# Patient Record
Sex: FEMALE | Race: WHITE
[De-identification: names, ages, dates, MRNs, and addresses within clinical notes are randomized per-mention and may not be internally consistent; named-entity substitution may affect disease eponyms.]

---

## 2022-05-18 ENCOUNTER — HOSPITAL ENCOUNTER (EMERGENCY)
Dept: HOSPITAL 63 - ER | Age: 52
Discharge: ANOTHER HOSPITAL | End: 2022-05-18
Payer: SELF-PAY

## 2022-05-18 VITALS — BODY MASS INDEX: 22.42 KG/M2 | WEIGHT: 126.55 LBS | HEIGHT: 63 IN

## 2022-05-18 VITALS — DIASTOLIC BLOOD PRESSURE: 44 MMHG | SYSTOLIC BLOOD PRESSURE: 90 MMHG

## 2022-05-18 DIAGNOSIS — Z20.822: ICD-10-CM

## 2022-05-18 DIAGNOSIS — R79.89: ICD-10-CM

## 2022-05-18 DIAGNOSIS — J18.9: Primary | ICD-10-CM

## 2022-05-18 DIAGNOSIS — R09.02: ICD-10-CM

## 2022-05-18 LAB
% BANDS: 1 % (ref 0–9)
% LYMPHS: 9 % (ref 24–48)
% MONOS: 5 % (ref 0–10)
% SEGS: 85 % (ref 35–66)
ALBUMIN SERPL-MCNC: 2.8 G/DL (ref 3.4–5)
ALBUMIN/GLOB SERPL: 0.6 {RATIO} (ref 1–1.7)
ALP SERPL-CCNC: 102 U/L (ref 46–116)
ALT SERPL-CCNC: 22 U/L (ref 14–59)
ANION GAP SERPL CALC-SCNC: 14 MMOL/L (ref 6–14)
ANISOCYTOSIS BLD QL SMEAR: SLIGHT
AST SERPL-CCNC: 22 U/L (ref 15–37)
BASOPHILS # BLD AUTO: 0 X10^3/UL (ref 0–0.2)
BASOPHILS NFR BLD: 0 % (ref 0–3)
BILIRUB SERPL-MCNC: 0.8 MG/DL (ref 0.2–1)
BUN/CREAT SERPL: 17 (ref 6–20)
CA-I SERPL ISE-MCNC: 17 MG/DL (ref 7–20)
CALCIUM SERPL-MCNC: 9.2 MG/DL (ref 8.5–10.1)
CHLORIDE SERPL-SCNC: 95 MMOL/L (ref 98–107)
CO2 SERPL-SCNC: 22 MMOL/L (ref 21–32)
CREAT SERPL-MCNC: 1 MG/DL (ref 0.6–1)
EOSINOPHIL NFR BLD: 0 % (ref 0–3)
EOSINOPHIL NFR BLD: 0 X10^3/UL (ref 0–0.7)
ERYTHROCYTE [DISTWIDTH] IN BLOOD BY AUTOMATED COUNT: 14 % (ref 11.5–14.5)
GFR SERPLBLD BASED ON 1.73 SQ M-ARVRAT: 58 ML/MIN
GLOBULIN SER-MCNC: 4.4 G/DL (ref 2.2–3.8)
GLUCOSE SERPL-MCNC: 192 MG/DL (ref 70–99)
HCT VFR BLD CALC: 41.4 % (ref 36–47)
HGB BLD-MCNC: 13.5 G/DL (ref 12–15.5)
INFLUENZA A PATIENT: NEGATIVE
INFLUENZA B PATIENT: NEGATIVE
LYMPHOCYTES # BLD: 1 X10^3/UL (ref 1–4.8)
LYMPHOCYTES NFR BLD AUTO: 6 % (ref 24–48)
MCH RBC QN AUTO: 28 PG (ref 25–35)
MCHC RBC AUTO-ENTMCNC: 33 G/DL (ref 31–37)
MCV RBC AUTO: 86 FL (ref 79–100)
MONO #: 0.8 X10^3/UL (ref 0–1.1)
MONOCYTES NFR BLD: 5 % (ref 0–9)
NEUT #: 14.4 X10^3UL (ref 1.8–7.7)
NEUTROPHILS NFR BLD AUTO: 89 % (ref 31–73)
PLATELET # BLD AUTO: 395 X10^3/UL (ref 140–400)
PLATELET # BLD EST: ADEQUATE 10*3/UL
POTASSIUM SERPL-SCNC: 4.2 MMOL/L (ref 3.5–5.1)
PROT SERPL-MCNC: 7.2 G/DL (ref 6.4–8.2)
RBC # BLD AUTO: 4.79 X10^6/UL (ref 3.5–5.4)
SODIUM SERPL-SCNC: 131 MMOL/L (ref 136–145)
WBC # BLD AUTO: 16.2 X10^3/UL (ref 4–11)

## 2022-05-18 PROCEDURE — 85025 COMPLETE CBC W/AUTO DIFF WBC: CPT

## 2022-05-18 PROCEDURE — 36415 COLL VENOUS BLD VENIPUNCTURE: CPT

## 2022-05-18 PROCEDURE — 96366 THER/PROPH/DIAG IV INF ADDON: CPT

## 2022-05-18 PROCEDURE — 96365 THER/PROPH/DIAG IV INF INIT: CPT

## 2022-05-18 PROCEDURE — 93005 ELECTROCARDIOGRAM TRACING: CPT

## 2022-05-18 PROCEDURE — 99285 EMERGENCY DEPT VISIT HI MDM: CPT

## 2022-05-18 PROCEDURE — 96368 THER/DIAG CONCURRENT INF: CPT

## 2022-05-18 PROCEDURE — 85007 BL SMEAR W/DIFF WBC COUNT: CPT

## 2022-05-18 PROCEDURE — 84484 ASSAY OF TROPONIN QUANT: CPT

## 2022-05-18 PROCEDURE — 87428 SARSCOV & INF VIR A&B AG IA: CPT

## 2022-05-18 PROCEDURE — 80053 COMPREHEN METABOLIC PANEL: CPT

## 2022-05-18 PROCEDURE — 87040 BLOOD CULTURE FOR BACTERIA: CPT

## 2022-05-18 PROCEDURE — 85379 FIBRIN DEGRADATION QUANT: CPT

## 2022-05-18 PROCEDURE — 71275 CT ANGIOGRAPHY CHEST: CPT

## 2022-05-18 PROCEDURE — 83605 ASSAY OF LACTIC ACID: CPT

## 2022-05-18 NOTE — PHYS DOC
General Adult


EDM:


Chief Complaint:  SHORTNESS OF BREATH





HPI:


HPI:


Patient is a 51-year-old female who presents to the emergency department for 

midsternal chest pain that started 3 days ago.  She describes the pain as a 

tightness rated 9 out of 10.  It is reproducible with palpation.  She is also 

reporting shortness of breath, nausea and fevers.  She reports that her 

temperature at home was 101.  Patient denies any vomiting.  She does have a 

history of 28 years of smoking and does report increased cough.  She denies any 

formal diagnosis of COPD.  She does not wear any oxygen at home.





Review of Systems:


Review of Systems:


Constitutional:  see HPI


Respiratory:  see HPI


Cardiovascular: see HPI


GI:  see HPI


Musculoskeletal:  see HPI





Allergies:


Allergies:





Allergies








Coded Allergies Type Severity Reaction Last Updated Verified


 


  pseudoephedrine Allergy Unknown  5/18/22 Yes











Physical Exam:


PE:





Constitutional: Well developed, well nourished, no acute distress, non-toxic 

appearance. []


HENT: Normocephalic, atraumatic, bilateral external ears normal, oropharynx 

moist, no oral exudates, nose normal. []


Eyes: PERRL, EOMI, conjunctiva normal, no discharge. [] 


Neck: Normal range of motion, no tenderness, supple, no stridor. [] 


Cardiovascular:Heart rate regular rhythm, no murmur, midsternal chest wall 

tenderness with palpation []


Lungs & Thorax:  coarse lower lobes, labored, hypoxic, tachypneic []


Abdomen: Bowel sounds normal, soft, no tenderness, no masses, no pulsatile 

masses. [] 


Skin: Warm, dry, no erythema, no rash. [] Purple discoloration of bilateral hand

s


Back: No tenderness, normal range of motion


Extremities: No tenderness, no cyanosis, no clubbing, ROM intact, no edema. [] 


Neurologic: Alert and oriented X 3, normal motor function, normal sensory 

function, no focal deficits noted. []


Psychologic: Affect normal, judgement normal, mood normal. []





Current Patient Data:


Labs:





Laboratory Tests








Test


 5/18/22


15:20 5/18/22


17:05


 


White Blood Count 16.2 x10^3/uL  


 


Red Blood Count 4.79 x10^6/uL  


 


Hemoglobin 13.5 g/dL  


 


Hematocrit 41.4 %  


 


Mean Corpuscular Volume 86 fL  


 


Mean Corpuscular Hemoglobin 28 pg  


 


Mean Corpuscular Hemoglobin


Concent 33 g/dL 


 





 


Red Cell Distribution Width 14.0 %  


 


Platelet Count 395 x10^3/uL  


 


Neutrophils (%) (Auto) 89 %  


 


Lymphocytes (%) (Auto) 6 %  


 


Monocytes (%) (Auto) 5 %  


 


Eosinophils (%) (Auto) 0 %  


 


Basophils (%) (Auto) 0 %  


 


Neutrophils # (Auto) 14.4 x10^3uL  


 


Lymphocytes # (Auto) 1.0 x10^3/uL  


 


Monocytes # (Auto) 0.8 x10^3/uL  


 


Eosinophils # (Auto) 0.0 x10^3/uL  


 


Basophils # (Auto) 0.0 x10^3/uL  


 


Segmented Neutrophils % 85 %  


 


Band Neutrophils % 1 %  


 


Lymphocytes % 9 %  


 


Monocytes % 5 %  


 


Platelet Estimate Adequate  


 


Large Platelets Occ  


 


Anisocytosis Slight  


 


D-Dimer (Peg) 3.65 mg/L  


 


Sodium Level 131 mmol/L  


 


Potassium Level 4.2 mmol/L  


 


Chloride Level 95 mmol/L  


 


Carbon Dioxide Level 22 mmol/L  


 


Anion Gap 14  


 


Blood Urea Nitrogen 17 mg/dL  


 


Creatinine 1.0 mg/dL  


 


Estimated GFR


(Cockcroft-Gault) 58.0 


 





 


BUN/Creatinine Ratio 17  


 


Glucose Level 192 mg/dL  


 


Calcium Level 9.2 mg/dL  


 


Total Bilirubin 0.8 mg/dL  


 


Aspartate Amino Transf


(AST/SGOT) 22 U/L 


 





 


Alanine Aminotransferase


(ALT/SGPT) 22 U/L 


 





 


Alkaline Phosphatase 102 U/L  


 


Troponin I High Sensitivity 99 ng/L  


 


Total Protein 7.2 g/dL  


 


Albumin 2.8 g/dL  


 


Albumin/Globulin Ratio 0.6  


 


Influenza Type A (Rapid)  Negative 


 


Influenza Type B (Rapid)  Negative 


 


SARS-CoV-2 Antigen (Rapid)  Negative 








Current Medications








 Medications


  (Trade)  Dose


 Ordered  Sig/Paul


 Route


 PRN Reason  Start Time


 Stop Time Status Last Admin


Dose Admin


 


 Sodium Chloride  1,000 ml @ 


 1,000 mls/hr  Q1H


 IV


   5/18/22 15:00


 5/18/22 15:00 DC  





 


 Fentanyl Citrate


  (Fentanyl 2ml


 Vial)  50 mcg  1X  ONCE


 IVP


   5/18/22 15:00


 5/18/22 15:01 DC  





 


 Iohexol


  (Omnipaque 350


 Mg/ml)  100 ml  1X  ONCE


 IV


   5/18/22 15:15


 5/18/22 15:37 DC 5/18/22 16:17





 


 Ceftriaxone


 Sodium 1 gm/


 Sodium Chloride  50 ml @ 


 100 mls/hr  1X  ONCE


 IV


   5/18/22 17:00


 5/18/22 17:29 DC 5/18/22 17:14





 


 Azithromycin 500


 mg/Sodium Chloride  250 ml @ 


 250 mls/hr  1X  ONCE


 IV


   5/18/22 17:00


 5/18/22 17:59  5/18/22 17:35





 


 Sodium Chloride  1,000 ml @ 


 1,000 mls/hr  1X  ONCE


 IV


   5/18/22 17:00


 5/18/22 17:59  5/18/22 17:07





 


 Sodium Chloride  50 ml @ As


 Directed  STK-MED ONCE


 .ROUTE


   5/18/22 17:11


 5/18/22 17:11 DC  





 


 Ceftriaxone Sodium


  (Rocephin)  1 gm  STK-MED ONCE


 .ROUTE


   5/18/22 17:11


 5/18/22 17:11 DC  





 


 Sodium Chloride  250 ml @ 


 As Directed  STK-MED ONCE


 .ROUTE


   5/18/22 17:11


 5/18/22 17:12 DC  





 


 Azithromycin


  (Zithromax)  500 mg  STK-MED ONCE


 IV


   5/18/22 17:11


 5/18/22 17:12 DC  





 


 Acetaminophen


  (Tylenol)  650 mg  1X  ONCE


 PO


   5/18/22 17:30


 5/18/22 17:31 DC 5/18/22 17:31














EKG:


EKG:


EKG performed by ER staff at 1451 shows sinus tachycardia with a rate of 131 

with st changes, no stemi read by Dr. Castañeda.





Radiology/Procedures:


Radiology/Procedures:


[]PROCEDURE: CT ANGIOGRAPHY CHEST





CT angiography chest with contrast





PQRS statement: CT scans at this facility use dose reduction including either 

automated exposure control, iterative reconstructions, and /or weight based 

radiation dosing via mA and kV modification when appropriate to reduce radiation

 dose to as low as reasonably achievable.





HISTORY: Tachycardia. EKG changes. Chest pain. Hypoxia.





Contrast: 1 mL Optiray 350 intravenous contrast. 3-D MIP projections of the 

arteries were acquired.





 Small disc herniation at T6-T7. Calcified plaque coronary arteries and thoracic

 aorta. Cardiomegaly, with enlargement of the right heart chambers. Esophagus 

unremarkable. No pulmonary artery emboli. Large mediastinal adenopathy with ill-

defined collections of nodes as well as hilar adenopathy largest in the right 

lower hilum measuring 1.9 cm image 82. Centrilobular pulmonary emphysema. 

Trachea unremarkable. There is some endobronchial plugging of some of the medial

 basilar left lower lobe bronchus. There is mild volume loss with consolidative 

opacity with air bronchograms throughout the left lower lobe from the superior 

segments through the medial and posterior basilar segments down to the 

diaphragm. Separately at the lingula is a 2.5 cm opacity of both groundglass and

 consolidative densities. Smaller 1.5 cm low-density groundglass nodule right 

upper lobe above the minor fissure. The right middle lobe there is bandlike 

atelectasis abutting the diaphragm with mild volume loss and some surrounding 

groundglass opacities. The right lower lobe basilar segments abutting the 

diaphragm demonstrate opacities with air bronchograms. Bones unremarkable.





IMPRESSION:


1. No pulmonary artery emboli.





2. Bilateral lower lobe consolidative opacities with air bronchograms most 

likely multilobar pneumonia. There are separate indeterminate groundglass 

opacities of the lingula, right middle lobe and right upper lobe, could re

present other areas of infection/inflammation.





3. Enlarged mediastinal and bilateral hilar adenopathy.





4. Follow-up CT chest imaging in 3 months is advised to document that the 

pulmonary opacities resolve and that the adenopathy resolves or decreases over 

time to exclude the possibility of an underlying neoplastic process contributing

 to these findings.





Electronically signed by: Patrick Peterson MD (5/18/2022 4:41 PM) [facility]














DICTATED AND SIGNED BY:     PATRICK PETERSON MD


DATE:     05/18/22 1631





CC: NIEVES SOLORIO; PCP,NO ~





Heart Score:


C/O Chest Pain:  Yes


HEART Score for Chest Pain:  








HEART Score for Chest Pain Response (Comments) Value


 


History Highly Suspicious 2


 


ECG Nonspecific Repolarizatio 1


 


Age >45 - < 65 1


 


Risk Factors                            1 or 2 Risk Factors 1


 


Troponin >1-<3x Normal Limit 1


 


Total  6








Risk Factors:


Risk Factors:  DM, Current or recent (<one month) smoker, HTN, HLP, family 

history of CAD, obesity.


Risk Scores:


Score 0 - 3:  2.5% MACE over next 6 weeks - Discharge Home


Score 4 - 6:  20.3% MACE over next 6 weeks - Admit for Clinical Observation


Score 7 - 10:  72.7% MACE over next 6 weeks - Early Invasive Strategies





Course & Med Decision Making:


Course & Med Decision Making


Pertinent Labs and Imaging studies reviewed. (See chart for details)





[] Patient presents to the emergency department today for chest pain with 

shortness of breath, nausea and fevers.  Patient does not wear oxygen at home is

 requiring 2 L via nasal cannula and her oxygen saturation is 92 to 93%.  

Patient is also tachycardic with a heart rate of 130.  Work-up in the ER 

consisted of blood work including troponin and D-dimer, CT angio of chest to 

rule out pulmonary embolism and EKG.


EKG shows sinus tachycardia and evidence of right heart strain.  Patient was 

noted to have leukocytosis with a white blood cell count of 16.4.  Elevated 

D-dimer at 3.65.  Troponin was elevated at 99.  Serial cardiac enzymes ordered. 

 Patient's heart score was 6.  Patient was given 30 mill per kilo IV fluid 

bolus.  CT angio did not show any pulmonary embolism but did show multilobular 

pneumonia.  Patient will be treated with antibiotics.  Lactic and blood cultures

 ordered.  Patient is requesting Tylenol for pain she also has a temperature of 

99.9 and Tylenol was ordered.  Patient will need to be admitted for hypoxia, 

pneumonia with IV antibiotics and an elevated troponin with cardiology 

consultation.


Discussed patient's case with Dr. Fry who agreed to admit the patient under 

his services for pneumonia and hypoxia.  Discussed patient's case with Dr. Harrington who is a cardiologist on-call at [facility].  I informed 

him of patient's troponin of 99 and delta troponin of 98.  He advised placing 

patient on heparin bolus and heparin drip.  This was ordered for patient.  

Patient to be transported to [facility] via EMS.





Dragon Disclaimer:


Dragon Disclaimer:


This electronic medical record was generated, in whole or in part, using a voice

 recognition dictation system.





Departure


Departure:


Impression:  


   Primary Impression:  


   Pneumonia


   Qualified Codes:  J18.9 - Pneumonia, unspecified organism


   Additional Impressions:  


   Hypoxia


   Elevated troponin


Disposition:  [facility]


Condition:  STABLE


Referrals:  


PCP,NO (PCP)











NIEVES SOLORIO APRN          May 18, 2022 15:05

## 2022-05-18 NOTE — RAD
CT angiography chest with contrast



PQRS statement: CT scans at this facility use dose reduction including either automated exposure cont
rol, iterative reconstructions, and /or weight based radiation dosing via mA and kV modification when
 appropriate to reduce radiation dose to as low as reasonably achievable.



HISTORY: Tachycardia. EKG changes. Chest pain. Hypoxia.



Contrast: 1 mL Optiray 350 intravenous contrast. 3-D MIP projections of the arteries were acquired.



 Small disc herniation at T6-T7. Calcified plaque coronary arteries and thoracic aorta. Cardiomegaly,
 with enlargement of the right heart chambers. Esophagus unremarkable. No pulmonary artery emboli. La
rge mediastinal adenopathy with ill-defined collections of nodes as well as hilar adenopathy largest 
in the right lower hilum measuring 1.9 cm image 82. Centrilobular pulmonary emphysema. Trachea unrema
rkable. There is some endobronchial plugging of some of the medial basilar left lower lobe bronchus. 
There is mild volume loss with consolidative opacity with air bronchograms throughout the left lower 
lobe from the superior segments through the medial and posterior basilar segments down to the diaphra
gm. Separately at the lingula is a 2.5 cm opacity of both groundglass and consolidative densities. Sm
aller 1.5 cm low-density groundglass nodule right upper lobe above the minor fissure. The right middl
e lobe there is bandlike atelectasis abutting the diaphragm with mild volume loss and some surroundin
g groundglass opacities. The right lower lobe basilar segments abutting the diaphragm demonstrate opa
cities with air bronchograms. Bones unremarkable.



IMPRESSION:

1. No pulmonary artery emboli.



2. Bilateral lower lobe consolidative opacities with air bronchograms most likely multilobar pneumoni
a. There are separate indeterminate groundglass opacities of the lingula, right middle lobe and right
 upper lobe, could represent other areas of infection/inflammation.



3. Enlarged mediastinal and bilateral hilar adenopathy.



4. Follow-up CT chest imaging in 3 months is advised to document that the pulmonary opacities resolve
 and that the adenopathy resolves or decreases over time to exclude the possibility of an underlying 
neoplastic process contributing to these findings.



Electronically signed by: Ricardo Plata MD (5/18/2022 4:41 PM) [facility]